# Patient Record
Sex: FEMALE | Race: BLACK OR AFRICAN AMERICAN | NOT HISPANIC OR LATINO
[De-identification: names, ages, dates, MRNs, and addresses within clinical notes are randomized per-mention and may not be internally consistent; named-entity substitution may affect disease eponyms.]

---

## 2018-08-30 ENCOUNTER — RESULT REVIEW (OUTPATIENT)
Age: 58
End: 2018-08-30

## 2019-02-05 ENCOUNTER — EMERGENCY (EMERGENCY)
Facility: HOSPITAL | Age: 59
LOS: 0 days | Discharge: HOME | End: 2019-02-05
Attending: EMERGENCY MEDICINE
Payer: COMMERCIAL

## 2019-02-05 VITALS
WEIGHT: 154.98 LBS | HEART RATE: 74 BPM | TEMPERATURE: 98 F | OXYGEN SATURATION: 100 % | RESPIRATION RATE: 18 BRPM | SYSTOLIC BLOOD PRESSURE: 108 MMHG | DIASTOLIC BLOOD PRESSURE: 56 MMHG | HEIGHT: 66 IN

## 2019-02-05 VITALS
DIASTOLIC BLOOD PRESSURE: 66 MMHG | HEART RATE: 88 BPM | RESPIRATION RATE: 18 BRPM | OXYGEN SATURATION: 100 % | TEMPERATURE: 98 F | SYSTOLIC BLOOD PRESSURE: 95 MMHG

## 2019-02-05 DIAGNOSIS — R10.9 UNSPECIFIED ABDOMINAL PAIN: ICD-10-CM

## 2019-02-05 DIAGNOSIS — N20.1 CALCULUS OF URETER: ICD-10-CM

## 2019-02-05 LAB
ALBUMIN SERPL ELPH-MCNC: 3.6 G/DL — SIGNIFICANT CHANGE UP (ref 3.3–5)
ALP SERPL-CCNC: 121 U/L — HIGH (ref 40–120)
ALT FLD-CCNC: 17 U/L — SIGNIFICANT CHANGE UP (ref 12–78)
ANION GAP SERPL CALC-SCNC: 7 MMOL/L — SIGNIFICANT CHANGE UP (ref 5–17)
APPEARANCE UR: CLEAR — SIGNIFICANT CHANGE UP
AST SERPL-CCNC: 16 U/L — SIGNIFICANT CHANGE UP (ref 15–37)
BACTERIA # UR AUTO: ABNORMAL
BASOPHILS # BLD AUTO: 0.04 K/UL — SIGNIFICANT CHANGE UP (ref 0–0.2)
BASOPHILS NFR BLD AUTO: 0.7 % — SIGNIFICANT CHANGE UP (ref 0–2)
BILIRUB SERPL-MCNC: 0.2 MG/DL — SIGNIFICANT CHANGE UP (ref 0.2–1.2)
BILIRUB UR-MCNC: NEGATIVE — SIGNIFICANT CHANGE UP
BUN SERPL-MCNC: 14 MG/DL — SIGNIFICANT CHANGE UP (ref 7–23)
CALCIUM SERPL-MCNC: 9.5 MG/DL — SIGNIFICANT CHANGE UP (ref 8.5–10.1)
CHLORIDE SERPL-SCNC: 109 MMOL/L — HIGH (ref 96–108)
CO2 SERPL-SCNC: 28 MMOL/L — SIGNIFICANT CHANGE UP (ref 22–31)
COLOR SPEC: YELLOW — SIGNIFICANT CHANGE UP
CREAT SERPL-MCNC: 0.96 MG/DL — SIGNIFICANT CHANGE UP (ref 0.5–1.3)
DIFF PNL FLD: ABNORMAL
EOSINOPHIL # BLD AUTO: 0.05 K/UL — SIGNIFICANT CHANGE UP (ref 0–0.5)
EOSINOPHIL NFR BLD AUTO: 0.9 % — SIGNIFICANT CHANGE UP (ref 0–6)
GLUCOSE SERPL-MCNC: 95 MG/DL — SIGNIFICANT CHANGE UP (ref 70–99)
GLUCOSE UR QL: NEGATIVE MG/DL — SIGNIFICANT CHANGE UP
HCT VFR BLD CALC: 46.5 % — HIGH (ref 34.5–45)
HGB BLD-MCNC: 14.1 G/DL — SIGNIFICANT CHANGE UP (ref 11.5–15.5)
IMM GRANULOCYTES NFR BLD AUTO: 0.2 % — SIGNIFICANT CHANGE UP (ref 0–1.5)
KETONES UR-MCNC: NEGATIVE — SIGNIFICANT CHANGE UP
LEUKOCYTE ESTERASE UR-ACNC: NEGATIVE — SIGNIFICANT CHANGE UP
LIDOCAIN IGE QN: 145 U/L — SIGNIFICANT CHANGE UP (ref 73–393)
LYMPHOCYTES # BLD AUTO: 2.16 K/UL — SIGNIFICANT CHANGE UP (ref 1–3.3)
LYMPHOCYTES # BLD AUTO: 38.6 % — SIGNIFICANT CHANGE UP (ref 13–44)
MAGNESIUM SERPL-MCNC: 2.2 MG/DL — SIGNIFICANT CHANGE UP (ref 1.6–2.6)
MCHC RBC-ENTMCNC: 26.3 PG — LOW (ref 27–34)
MCHC RBC-ENTMCNC: 30.3 GM/DL — LOW (ref 32–36)
MCV RBC AUTO: 86.8 FL — SIGNIFICANT CHANGE UP (ref 80–100)
MONOCYTES # BLD AUTO: 0.45 K/UL — SIGNIFICANT CHANGE UP (ref 0–0.9)
MONOCYTES NFR BLD AUTO: 8.1 % — SIGNIFICANT CHANGE UP (ref 2–14)
NEUTROPHILS # BLD AUTO: 2.88 K/UL — SIGNIFICANT CHANGE UP (ref 1.8–7.4)
NEUTROPHILS NFR BLD AUTO: 51.5 % — SIGNIFICANT CHANGE UP (ref 43–77)
NITRITE UR-MCNC: NEGATIVE — SIGNIFICANT CHANGE UP
NRBC # BLD: 0 /100 WBCS — SIGNIFICANT CHANGE UP (ref 0–0)
PH UR: 5 — SIGNIFICANT CHANGE UP (ref 5–8)
PLATELET # BLD AUTO: 200 K/UL — SIGNIFICANT CHANGE UP (ref 150–400)
POTASSIUM SERPL-MCNC: 4.6 MMOL/L — SIGNIFICANT CHANGE UP (ref 3.5–5.3)
POTASSIUM SERPL-SCNC: 4.6 MMOL/L — SIGNIFICANT CHANGE UP (ref 3.5–5.3)
PROT SERPL-MCNC: 7.9 GM/DL — SIGNIFICANT CHANGE UP (ref 6–8.3)
PROT UR-MCNC: NEGATIVE MG/DL — SIGNIFICANT CHANGE UP
RBC # BLD: 5.36 M/UL — HIGH (ref 3.8–5.2)
RBC # FLD: 13.3 % — SIGNIFICANT CHANGE UP (ref 10.3–14.5)
RBC CASTS # UR COMP ASSIST: ABNORMAL /HPF (ref 0–4)
SODIUM SERPL-SCNC: 144 MMOL/L — SIGNIFICANT CHANGE UP (ref 135–145)
SP GR SPEC: 1.02 — SIGNIFICANT CHANGE UP (ref 1.01–1.02)
UROBILINOGEN FLD QL: NEGATIVE MG/DL — SIGNIFICANT CHANGE UP
WBC # BLD: 5.59 K/UL — SIGNIFICANT CHANGE UP (ref 3.8–10.5)
WBC # FLD AUTO: 5.59 K/UL — SIGNIFICANT CHANGE UP (ref 3.8–10.5)
WBC UR QL: SIGNIFICANT CHANGE UP

## 2019-02-05 PROCEDURE — 99284 EMERGENCY DEPT VISIT MOD MDM: CPT

## 2019-02-05 PROCEDURE — 74176 CT ABD & PELVIS W/O CONTRAST: CPT | Mod: 26

## 2019-02-05 RX ORDER — ONDANSETRON 8 MG/1
4 TABLET, FILM COATED ORAL ONCE
Qty: 0 | Refills: 0 | Status: COMPLETED | OUTPATIENT
Start: 2019-02-05 | End: 2019-02-05

## 2019-02-05 RX ORDER — KETOROLAC TROMETHAMINE 30 MG/ML
30 SYRINGE (ML) INJECTION ONCE
Qty: 0 | Refills: 0 | Status: DISCONTINUED | OUTPATIENT
Start: 2019-02-05 | End: 2019-02-05

## 2019-02-05 RX ORDER — SODIUM CHLORIDE 9 MG/ML
1000 INJECTION INTRAMUSCULAR; INTRAVENOUS; SUBCUTANEOUS ONCE
Qty: 0 | Refills: 0 | Status: COMPLETED | OUTPATIENT
Start: 2019-02-05 | End: 2019-02-05

## 2019-02-05 RX ORDER — TAMSULOSIN HYDROCHLORIDE 0.4 MG/1
1 CAPSULE ORAL
Qty: 7 | Refills: 0 | OUTPATIENT
Start: 2019-02-05 | End: 2019-02-11

## 2019-02-05 RX ORDER — TAMSULOSIN HYDROCHLORIDE 0.4 MG/1
0.4 CAPSULE ORAL ONCE
Qty: 0 | Refills: 0 | Status: COMPLETED | OUTPATIENT
Start: 2019-02-05 | End: 2019-02-05

## 2019-02-05 RX ADMIN — TAMSULOSIN HYDROCHLORIDE 0.4 MILLIGRAM(S): 0.4 CAPSULE ORAL at 09:45

## 2019-02-05 RX ADMIN — ONDANSETRON 4 MILLIGRAM(S): 8 TABLET, FILM COATED ORAL at 09:45

## 2019-02-05 RX ADMIN — SODIUM CHLORIDE 1000 MILLILITER(S): 9 INJECTION INTRAMUSCULAR; INTRAVENOUS; SUBCUTANEOUS at 11:01

## 2019-02-05 RX ADMIN — Medication 30 MILLIGRAM(S): at 10:00

## 2019-02-05 RX ADMIN — Medication 30 MILLIGRAM(S): at 09:45

## 2019-02-05 RX ADMIN — SODIUM CHLORIDE 1000 MILLILITER(S): 9 INJECTION INTRAMUSCULAR; INTRAVENOUS; SUBCUTANEOUS at 09:46

## 2019-02-05 NOTE — ED PROVIDER NOTE - NSFOLLOWUPINSTRUCTIONS_ED_ALL_ED_FT
Take the pain medicine and the tamsulosin as prescribed to treat pain and a possible ureteral stone.    If not improved within 2 weeks, follow up with an ultrasound of your uterus.

## 2019-02-05 NOTE — ED PROVIDER NOTE - OBJECTIVE STATEMENT
Pertinent PMH/PSH/FHx/SHx and Review of Systems contained within:  58F hx of kidney stone on the right pw right flank pain since yesterday, fluctuating in intensity. not associated with nausea, vomiting, fever, dysuria, hematuria, abd pain, vaginal discharge. patient notes she has not taken anything for pain. had ct that dxed stone 1 week ago. No ha, vision loss, rhinorrhea, cp, sob, dizziness.  Fh and Sh not otherwise contributory  ROS otherwise negative

## 2019-02-05 NOTE — ED PROVIDER NOTE - MEDICAL DECISION MAKING DETAILS
patient pw right flank pain. likely renal stone. patient pw right flank pain. likely renal stone. ct does not show ureteral stone but there is blood in the urine so she may have passed it. no infection on ua. otherwise, the pain could be msk related. will rx nsaid and flomax and have follow up with doctor.

## 2019-02-05 NOTE — ED ADULT TRIAGE NOTE - CHIEF COMPLAINT QUOTE
pt presents to the ED with c/o right flank pain denies radiation told she has a stone by her PCP and to report to the ED for further evaluation

## 2019-02-05 NOTE — ED ADULT NURSE NOTE - CAS ELECT INFOMATION PROVIDED
DC instructions/discharged home, instructed to follow up with primary md, verbalized understanding of instructions

## 2019-02-05 NOTE — ED ADULT NURSE NOTE - NSIMPLEMENTINTERV_GEN_ALL_ED
Implemented All Universal Safety Interventions:  [place] to call system. Call bell, personal items and telephone within reach. Instruct patient to call for assistance. Room bathroom lighting operational. Non-slip footwear when patient is off stretcher. Physically safe environment: no spills, clutter or unnecessary equipment. Stretcher in lowest position, wheels locked, appropriate side rails in place.

## 2019-02-05 NOTE — ED PROVIDER NOTE - PHYSICAL EXAMINATION
Gen: Alert, NAD  Head: NC, AT   Eyes: PERRL, EOMI, normal lids/conjunctiva  ENT: normal hearing, patent oropharynx without erythema/exudate, uvula midline  Neck: supple, no tenderness, Trachea midline  Pulm: Bilateral BS, normal resp effort, no wheeze/stridor/retractions  CV: RRR, no M/R/G, 2+ radial and dp pulses bl, no edema  Abd: soft, NT/ND, +BS, no hepatosplenomegaly. minimal right cva t  Mskel: extremities x4 with normal ROM and no joint effusions. no ctl spine ttp.   Skin: no rash, no bruising   Neuro: AAOx3, no sensory/motor deficits, CN 2-12 intact

## 2019-07-24 ENCOUNTER — RESULT REVIEW (OUTPATIENT)
Age: 59
End: 2019-07-24

## 2019-08-12 ENCOUNTER — RESULT REVIEW (OUTPATIENT)
Age: 59
End: 2019-08-12

## 2020-12-08 ENCOUNTER — RESULT REVIEW (OUTPATIENT)
Age: 60
End: 2020-12-08

## 2021-10-30 ENCOUNTER — EMERGENCY (EMERGENCY)
Facility: HOSPITAL | Age: 61
LOS: 1 days | Discharge: HOME | End: 2021-10-30
Attending: EMERGENCY MEDICINE | Admitting: EMERGENCY MEDICINE
Payer: COMMERCIAL

## 2021-10-30 VITALS
HEIGHT: 66 IN | DIASTOLIC BLOOD PRESSURE: 60 MMHG | SYSTOLIC BLOOD PRESSURE: 110 MMHG | HEART RATE: 68 BPM | RESPIRATION RATE: 18 BRPM | OXYGEN SATURATION: 100 % | TEMPERATURE: 98 F

## 2021-10-30 VITALS
RESPIRATION RATE: 16 BRPM | TEMPERATURE: 98 F | SYSTOLIC BLOOD PRESSURE: 104 MMHG | HEART RATE: 63 BPM | OXYGEN SATURATION: 100 % | DIASTOLIC BLOOD PRESSURE: 90 MMHG

## 2021-10-30 LAB
ALBUMIN SERPL ELPH-MCNC: 4.4 G/DL — SIGNIFICANT CHANGE UP (ref 3.3–5)
ALP SERPL-CCNC: 68 U/L — SIGNIFICANT CHANGE UP (ref 40–120)
ALT FLD-CCNC: 21 U/L — SIGNIFICANT CHANGE UP (ref 4–33)
ANION GAP SERPL CALC-SCNC: 9 MMOL/L — SIGNIFICANT CHANGE UP (ref 7–14)
AST SERPL-CCNC: 20 U/L — SIGNIFICANT CHANGE UP (ref 4–32)
BILIRUB SERPL-MCNC: <0.2 MG/DL — SIGNIFICANT CHANGE UP (ref 0.2–1.2)
BUN SERPL-MCNC: 14 MG/DL — SIGNIFICANT CHANGE UP (ref 7–23)
CALCIUM SERPL-MCNC: 10 MG/DL — SIGNIFICANT CHANGE UP (ref 8.4–10.5)
CHLORIDE SERPL-SCNC: 106 MMOL/L — SIGNIFICANT CHANGE UP (ref 98–107)
CO2 SERPL-SCNC: 27 MMOL/L — SIGNIFICANT CHANGE UP (ref 22–31)
CREAT SERPL-MCNC: 0.82 MG/DL — SIGNIFICANT CHANGE UP (ref 0.5–1.3)
D DIMER BLD IA.RAPID-MCNC: <150 NG/ML DDU — SIGNIFICANT CHANGE UP
GLUCOSE SERPL-MCNC: 112 MG/DL — HIGH (ref 70–99)
HCT VFR BLD CALC: 44.2 % — SIGNIFICANT CHANGE UP (ref 34.5–45)
HGB BLD-MCNC: 13.7 G/DL — SIGNIFICANT CHANGE UP (ref 11.5–15.5)
LIDOCAIN IGE QN: 28 U/L — SIGNIFICANT CHANGE UP (ref 7–60)
MCHC RBC-ENTMCNC: 27.1 PG — SIGNIFICANT CHANGE UP (ref 27–34)
MCHC RBC-ENTMCNC: 31 GM/DL — LOW (ref 32–36)
MCV RBC AUTO: 87.5 FL — SIGNIFICANT CHANGE UP (ref 80–100)
NRBC # BLD: 0 /100 WBCS — SIGNIFICANT CHANGE UP
NRBC # FLD: 0 K/UL — SIGNIFICANT CHANGE UP
PLATELET # BLD AUTO: 169 K/UL — SIGNIFICANT CHANGE UP (ref 150–400)
POTASSIUM SERPL-MCNC: 4.2 MMOL/L — SIGNIFICANT CHANGE UP (ref 3.5–5.3)
POTASSIUM SERPL-SCNC: 4.2 MMOL/L — SIGNIFICANT CHANGE UP (ref 3.5–5.3)
PROT SERPL-MCNC: 7.8 G/DL — SIGNIFICANT CHANGE UP (ref 6–8.3)
RBC # BLD: 5.05 M/UL — SIGNIFICANT CHANGE UP (ref 3.8–5.2)
RBC # FLD: 13.5 % — SIGNIFICANT CHANGE UP (ref 10.3–14.5)
SODIUM SERPL-SCNC: 142 MMOL/L — SIGNIFICANT CHANGE UP (ref 135–145)
TROPONIN T, HIGH SENSITIVITY RESULT: <6 NG/L — SIGNIFICANT CHANGE UP
TROPONIN T, HIGH SENSITIVITY RESULT: <6 NG/L — SIGNIFICANT CHANGE UP
WBC # BLD: 5.44 K/UL — SIGNIFICANT CHANGE UP (ref 3.8–10.5)
WBC # FLD AUTO: 5.44 K/UL — SIGNIFICANT CHANGE UP (ref 3.8–10.5)

## 2021-10-30 PROCEDURE — 70496 CT ANGIOGRAPHY HEAD: CPT | Mod: 26,MA

## 2021-10-30 PROCEDURE — 0042T: CPT

## 2021-10-30 PROCEDURE — 93010 ELECTROCARDIOGRAM REPORT: CPT

## 2021-10-30 PROCEDURE — 99285 EMERGENCY DEPT VISIT HI MDM: CPT | Mod: 25

## 2021-10-30 PROCEDURE — 71046 X-RAY EXAM CHEST 2 VIEWS: CPT | Mod: 26

## 2021-10-30 PROCEDURE — 70498 CT ANGIOGRAPHY NECK: CPT | Mod: 26,MA

## 2021-10-30 PROCEDURE — 70450 CT HEAD/BRAIN W/O DYE: CPT | Mod: 26,MA,59

## 2021-10-30 RX ORDER — IBUPROFEN 200 MG
1 TABLET ORAL
Qty: 21 | Refills: 0
Start: 2021-10-30 | End: 2021-11-05

## 2021-10-30 RX ORDER — ASPIRIN/CALCIUM CARB/MAGNESIUM 324 MG
162 TABLET ORAL ONCE
Refills: 0 | Status: COMPLETED | OUTPATIENT
Start: 2021-10-30 | End: 2021-10-30

## 2021-10-30 RX ORDER — FAMOTIDINE 10 MG/ML
20 INJECTION INTRAVENOUS DAILY
Refills: 0 | Status: DISCONTINUED | OUTPATIENT
Start: 2021-10-30 | End: 2021-11-02

## 2021-10-30 RX ADMIN — FAMOTIDINE 20 MILLIGRAM(S): 10 INJECTION INTRAVENOUS at 11:08

## 2021-10-30 RX ADMIN — Medication 30 MILLILITER(S): at 11:08

## 2021-10-30 RX ADMIN — Medication 162 MILLIGRAM(S): at 11:08

## 2021-10-30 NOTE — ED PROVIDER NOTE - OBJECTIVE STATEMENT
Pt is a 60yoF w/no relevant PMHx p/w acute chest pain. Pt was unable to sleep last night due to discomfort, began experiencing sharp, burning 8/10 L-sided chest pain w/radiation to back, associated w/SOB and nausea. Pt is a 60yoF w/no relevant PMHx p/w acute chest pain. Pt was unable to sleep last night due to discomfort, began experiencing sharp, burning 8/10 L-sided chest pain w/radiation to back, associated w/SOB and nausea. Also describes dizziness for the last few days/feeling unsteady, ***incomplete note*** Pt is a 60yoF w/no relevant PMHx p/w acute chest pain. Pt was unable to sleep last night due to discomfort, began experiencing sharp, burning 8/10 L-sided chest pain w/radiation to back, associated w/SOB and nausea; pt took 2 baby asa before coming to ED which partially relieved the CP. Also describes dizziness for the last few days "feeling unsteady" associated w/bilateral ringing in ears and feeling "off-balance." Pt describes episode this am around 8am where she felt confused, couldn't brush her teeth 2/2 L arm weakness associated w/palpitations. Recent Moderna vaccine (#2) on Tuesday, otherwise pt felt her usual state of health until last night. She endorses SOB, burning/sharp CP, palpitations, nausea, dizziness/head spinning, L sided numbness; she denies HA, vomiting/diarrhea/constipation, fevers/chills, dysuria/hematuria, hematochezia/melena, swelling, generalized weakness, falls.

## 2021-10-30 NOTE — ED PROVIDER NOTE - PATIENT PORTAL LINK FT
You can access the FollowMyHealth Patient Portal offered by [facility] by registering at the following website: [email or web address]. By joining [employer]’s FollowMyHealth portal, you will also be able to view your health information using other applications (apps) compatible with our system.

## 2021-10-30 NOTE — ED PROVIDER NOTE - CLINICAL SUMMARY MEDICAL DECISION MAKING FREE TEXT BOX
Pt is a ***incomplete note*** Pt is a 60yoF w/no relevant PMHx p/w acute chest pain i/s/o several days of dizziness/ears ringing, 1 episode of sudden loss of strength in L arm this am now at baseline. VSS, phys exam significant for L-sided facial numbness w/o other neuro deficits, CP not reproducible on exam. R/o ACS, order cbc, cmp, trop, ecg, cxr, consider CT head for eval of ear ringing and L-sided facial numbness and reassess. -Melani Wilder, PGY-1

## 2021-10-30 NOTE — ED ADULT NURSE NOTE - IS THE PATIENT ABLE TO BE SCREENED?
[No] : No [0] : 2) Feeling down, depressed, or hopeless: Not at all (0) [With Family] : lives with family [] :  Yes [# Of Children ___] : has [unfilled] children [Sexually Active] : sexually active [Seat Belt] :  uses seat belt [] : No [de-identified] : no formal exercise [de-identified] : healthy [[id number]] : 0 [Reports changes in hearing] : Reports no changes in hearing [High Risk Behavior] : no high risk behavior [Reports changes in vision] : Reports no changes in vision [MammogramDate] : scheduled for today [PapSmearDate] : scheduled for Monday [Reports changes in dental health] : Reports no changes in dental health [de-identified] : [facility]

## 2021-10-30 NOTE — CONSULT NOTE ADULT - SUBJECTIVE AND OBJECTIVE BOX
Neurology  Consult Note  10-30-21    Name:  CEE ATKINS; 60y (1960)    Reason for Admission:     Chief Complaint:    HPI: 60 year old female w/ PMHx migraines p/w acute chest pain and shortness of breath overnight. Patient states that at around 08:30AM this morning, while brushing her teeth, she noted left arm "dullness," which she further describes as a heaviness with clumsiness. She also feels numbness in her left arm, face, and leg. Patient also reports ringing in her ear yesterday associated with dizziness ("feels like I'm spinning"). She also reports 8.5/10 headache yesterday, generalized, and throbbing. This is unlike her migraines in the past.     Review of Systems:  As states in HPI.        PMHx:     PFHx:     PSuHx:       Medications:  MEDICATIONS  (STANDING):  famotidine    Tablet 20 milliGRAM(s) Oral daily    MEDICATIONS  (PRN):      Vitals:  T(C): 36.9 (10-30-21 @ 10:04), Max: 36.9 (10-30-21 @ 10:04)  HR: 68 (10-30-21 @ 10:04) (68 - 68)  BP: 110/60 (10-30-21 @ 10:04) (110/60 - 110/60)  RR: 18 (10-30-21 @ 10:04) (18 - 18)  SpO2: 100% (10-30-21 @ 10:04) (100% - 100%)    Physical Examination: INCOMPLETE  Neurologic:    - Mental Status: Alert, awake, oriented to person, place, and time; Speech is fluent with intact naming, repetition, and comprehension; Good overall fund of knowledge; Immediate recall is 3/3 words and delayed recall is 3/3 words at 5 minutes; Able to spell WORLD backwards and perform serial 7 subtraction; Able to read and write a sentence.    - Cranial Nerves:  II: Visual fields are full to confrontation; Pupils are equal, round, and reactive to light;   III, IV, VI: Extraocular movements are intact without nystagmus.  V: Facial sensation is intact in the V1-V3 distribution bilaterally.  VII: Face is symmetric with normal eye closure and smile.  VIII: Hearing is intact to conversation.  IX, X: Uvula is midline and soft palate rises symmetrically.  XI: Head turning and shoulder shrug are intact.  XII: Tongue protrudes in the midline.    -Motor/Strength Testing:                                 Right           Left  Deltoid                     5                 5  Biceps                      5                 5  Triceps                     5                 5  Wrist Ext (radial)       5                 5  Wrist Flex                 5                 5  Interphalangeal        5                 5  APB (Thumb)            5                 5    Hip Flex                   5                  5  Knee Flex                 5                  5  Knee Ext	      5                  5  Dorsiflex                  5                  5  Plantarflex               5                  5    -There is no pronator drift. Normal muscle bulk and tone throughout.    - Reflexes:   Bicep (C5/C6):                  R 2+ --- L 2+   Tricep (C7):                      R 2+ --- L 2+   Brachioradialis (C5/C6) :   R 2+ --- L 2+   Patella (L3/L4) :                 R 2+ --- L 2+   Ankle (S1) :                       R 2+ --- L 2+     -Plant responses down bilaterally.    - Sensory: Intact throughout to light touch, pin prick, vibration, and joint-position.  - Coordination: Finger-nose-finger and heel-knee-shin intact without dysmetria. Rapid alternating hand movements intact.  - Gait: Normal steps, base, arm swing, and turning. Tandem gait is normal. Romberg testing is negative.    Labs:                        13.7   5.44  )-----------( 169      ( 30 Oct 2021 11:30 )             44.2          Radiology:     Neurology  Consult Note  10-30-21    Name:  CEE ATKINS; 60y (1960)    Reason for Admission:     Chief Complaint:    HPI: 60 year old female w/ PMHx migraines p/w acute chest pain and shortness of breath overnight. Patient states that at around 08:30AM this morning, while brushing her teeth, she noted left arm "dullness," which she further describes as a heaviness with clumsiness. She also feels numbness in her left arm, face, and leg. Patient also reports bilateral ringing in her ear yesterday associated with dizziness ("feels like I'm spinning"). She also reports 8.5/10 headache yesterday, generalized, and throbbing. This is unlike her migraines in the past.         Review of Systems:  As states in HPI.        PMHx:     PFHx:     PSuHx:       Medications:  MEDICATIONS  (STANDING):  famotidine    Tablet 20 milliGRAM(s) Oral daily    MEDICATIONS  (PRN):      Vitals:  T(C): 36.9 (10-30-21 @ 10:04), Max: 36.9 (10-30-21 @ 10:04)  HR: 68 (10-30-21 @ 10:04) (68 - 68)  BP: 110/60 (10-30-21 @ 10:04) (110/60 - 110/60)  RR: 18 (10-30-21 @ 10:04) (18 - 18)  SpO2: 100% (10-30-21 @ 10:04) (100% - 100%)    Physical Examination: INCOMPLETE  Neurologic:    - Mental Status: Alert, awake, oriented to person, place, and time; Speech is fluent with intact naming, repetition, and comprehension; Good overall fund of knowledge; Immediate recall is 3/3 words and delayed recall is 3/3 words at 5 minutes; Able to spell WORLD backwards and perform serial 7 subtraction; Able to read and write a sentence.    - Cranial Nerves:  II: Visual fields are full to confrontation; Pupils are equal, round, and reactive to light;   III, IV, VI: Extraocular movements are intact without nystagmus.  V: Facial sensation is intact in the V1-V3 distribution bilaterally.  VII: Face is symmetric with normal eye closure and smile.  VIII: Hearing is intact to conversation.  IX, X: Uvula is midline and soft palate rises symmetrically.  XI: Head turning and shoulder shrug are intact.  XII: Tongue protrudes in the midline.    -Motor/Strength Testing:                                 Right           Left  Deltoid                     5                 5  Biceps                      5                 5  Triceps                     5                 5  Wrist Ext (radial)       5                 5  Wrist Flex                 5                 5  Interphalangeal        5                 5  APB (Thumb)            5                 5    Hip Flex                   5                  5  Knee Flex                 5                  5  Knee Ext	      5                  5  Dorsiflex                  5                  5  Plantarflex               5                  5    -There is no pronator drift. Normal muscle bulk and tone throughout.    - Reflexes:   Bicep (C5/C6):                  R 2+ --- L 2+   Tricep (C7):                      R 2+ --- L 2+   Brachioradialis (C5/C6) :   R 2+ --- L 2+   Patella (L3/L4) :                 R 2+ --- L 2+   Ankle (S1) :                       R 2+ --- L 2+     -Plant responses down bilaterally.    - Sensory: Intact throughout to light touch, pin prick, vibration, and joint-position.  - Coordination: Finger-nose-finger and heel-knee-shin intact without dysmetria. Rapid alternating hand movements intact.  - Gait: Normal steps, base, arm swing, and turning. Tandem gait is normal. Romberg testing is negative.    Labs:                        13.7   5.44  )-----------( 169      ( 30 Oct 2021 11:30 )             44.2          Radiology:  < from: CT Angio Neck w/ IV Cont (10.30.21 @ 11:57) >  FINDINGS:    CTA BRAIN:  The [place] of [place] and vertebrobasilar system are unremarkable without evidence of stenosis, occlusion or saccular aneurysm dilation. No evidence for arterial venous malformation. The vertebral arteries are codominant.      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 There is normal relationship to the great vessels. The common carotid arteries, internal carotid arteries and vertebral arteries are normal in caliber. No evidence of stenosis, occlusion or saccular aneurysm dilation. The vertebral arteries are codominant.    CT PERFUSION:  Patient has only had less than 2 hours of symptoms may influence the CT perfusion.    No core infarct or evidence of delayed mean transit time is identified.    CBF<30% volume: 0 ml  Tmax>6.0 s volume: 0 ml  Mismatch volume: 0 ml  Mismatch ratio: none    IMPRESSION:    Negative CTP. No large vessel occlusion. Consider MRI as clinically warranted     Neurology  Consult Note  10-30-21    Name:  CEE ATKINS; 60y (1960)    Reason for Admission:     Chief Complaint:    HPI: 60 year old female w/ PMHx migraines p/w acute chest pain and shortness of breath overnight. Patient states that at around 08:30AM this morning, while brushing her teeth, she noted left arm "dullness," which she further describes as a heaviness with clumsiness. She also feels numbness in her left arm, face, and leg. Patient also reports bilateral ringing in her ear yesterday associated with dizziness ("feels like I'm spinning"). She also reports 8.5/10 headache yesterday, generalized, and throbbing. This is unlike her migraines in the past.       Review of Systems:    PMHx:   Migraines    PFHx:   No relevant    PSuHx:   No relevant    Medications:  MEDICATIONS  (STANDING):  famotidine    Tablet 20 milliGRAM(s) Oral daily    MEDICATIONS  (PRN):      Vitals:  T(C): 36.9 (10-30-21 @ 10:04), Max: 36.9 (10-30-21 @ 10:04)  HR: 68 (10-30-21 @ 10:04) (68 - 68)  BP: 110/60 (10-30-21 @ 10:04) (110/60 - 110/60)  RR: 18 (10-30-21 @ 10:04) (18 - 18)  SpO2: 100% (10-30-21 @ 10:04) (100% - 100%)    Physical Examination:   General: in no acute distress, non-diaphoretic  HEENT: non-icteric sclera, no conjunctival injection  Abdomen: soft, non-distended  Extremities: no edema, no wounds  Skin: no rashes, no lesions    Neurologic:    - Mental Status: Alert, awake, oriented to person, place, and time; Speech is fluent with intact naming, repetition, and comprehension; Good overall fund of knowledge;     - Cranial Nerves:  II: Visual fields are full to confrontation; Pupils are equal, round, and reactive to light;   III, IV, VI: Extraocular movements are intact without nystagmus.  V: Facial sensation is intact in the V1-V3 distribution bilaterally.  VII: Face is symmetric with normal eye closure and smile.  VIII: Hearing is intact to conversation.  IX, X: Uvula is midline and soft palate rises symmetrically.  XI: Head turning and shoulder shrug are intact.  XII: Tongue protrudes in the midline.    -Motor/Strength Testing:                                 Right           Left  Deltoid                     5                 5  Biceps                      5                 5  Triceps                     5                 5  Hip Flex                   5                  5  Knee Flex                 5                  5  Knee Ext	      5                  5  Dorsiflex                  5                  5  Plantarflex               5                  5    -There is no pronator drift. Normal muscle bulk and tone throughout.    - Reflexes:   Bicep (C5/C6):                  R 2+ --- L 2+   Brachioradialis (C5/C6) :   R 2+ --- L 2+   Patella (L3/L4) :                 R 2+ --- L 2+   Ankle (S1) :                       R 2+ --- L 2+     -Plant responses down bilaterally.    - Sensory: Intact throughout to light touch  - Coordination: Finger-nose-finger intact without dysmetria. Rapid alternating hand movements intact.  - Gait: Normal steps, base, arm swing, and turning.     Labs:                        13.7   5.44  )-----------( 169      ( 30 Oct 2021 11:30 )             44.2          Radiology:  < from: CT Angio Neck w/ IV Cont (10.30.21 @ 11:57) >  FINDINGS:    CTA BRAIN:  The [place] of [place] and vertebrobasilar system are unremarkable without evidence of stenosis, occlusion or saccular aneurysm dilation. No evidence for arterial venous malformation. The vertebral arteries are codominant.      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 There is normal relationship to the great vessels. The common carotid arteries, internal carotid arteries and vertebral arteries are normal in caliber. No evidence of stenosis, occlusion or saccular aneurysm dilation. The vertebral arteries are codominant.    CT PERFUSION:  Patient has only had less than 2 hours of symptoms may influence the CT perfusion.    No core infarct or evidence of delayed mean transit time is identified.    CBF<30% volume: 0 ml  Tmax>6.0 s volume: 0 ml  Mismatch volume: 0 ml  Mismatch ratio: none    IMPRESSION:    Negative CTP. No large vessel occlusion. Consider MRI as clinically warranted

## 2021-10-30 NOTE — ED PROVIDER NOTE - ATTENDING CONTRIBUTION TO CARE
60F p/w SOB and L upper left chest and back a/w nausea.  pain constant nonradiating a/w palpitations and some gen weakness, Pt is s/p moderna vax 2nd 4 days ago.  Also reports some dizziness, off balance.  Took ASA this AM and came here.  pMHX osteoporosis PSHX denies.  Fosamax.  Currently having some chest discomfort.  Exam benign here.  Pt also reports some ringing of ears. TMs OK here.  Some concern for ACS.  Plan check labs, trop, CXR.  EKG benign here.  Pt also reports some burning chest pain, rx pepcid, maalox.  Walking.  No nystagmus.  EKG SR At 66 no connor no std no twi.   qtc 383.  On exam pt reports decreased sensation to L arm face and leg, says it started at 830 this morning.  Face is symmetric here, gait is normal no motor weakness.  [employer] code called 1108 AM.  D/w neuro at this time. 60F p/w SOB and L upper left chest and back a/w nausea.  pain constant nonradiating a/w palpitations and some gen weakness, Pt is s/p moderna vax 2nd 4 days ago.  Also reports some dizziness, off balance.  Took ASA this AM and came here.  pMHX osteoporosis PSHX denies.  Fosamax.  Currently having some chest discomfort.  Exam benign here.  Pt also reports some ringing of ears. TMs OK here.  Some concern for ACS.  Plan check labs, trop, CXR.  EKG benign here.  Pt also reports some burning chest pain, rx pepcid, maalox.  Walking.  No nystagmus.  EKG SR At 66 no connor no std no twi.   qtc 383.  On exam pt reports decreased sensation to L arm face and leg, says it started at 830 this morning.  Face is symmetric here, gait is normal no motor weakness.  [employer] code called 1108 AM.  D/w neuro at this time.  VS:  unremarkable    GEN - NAD;  malaise;   A+O x3   HEAD - NC/AT     ENT - PEERL, EOMI, mucous membranes    moist , no discharge      NECK: Neck supple, non-tender without lymphadenopathy, no masses, no JVD  PULM - CTA b/l,  symmetric breath sounds  COR -  normal heart sounds    ABD - , ND, NT, soft,  BACK - no CVA tenderness, nontender spine     EXTREMS - no edema, no deformity, warm and well perfused    SKIN - no rash    or bruising      NEUROLOGIC - alert, face symmetric, speech fluent, sensation decreased to arm, face, leg;  motor no focal deficit.

## 2021-10-30 NOTE — ED PROVIDER NOTE - CHIEF COMPLAINT
The patient is a 60y Female complaining of  The patient is a 60y Female complaining of multiple medical complaints.

## 2021-10-30 NOTE — ED ADULT NURSE NOTE - OBJECTIVE STATEMENT
Receive pt. in Intake room 7 alert and oriented x 4, presenting to the ER with complaints of left upper chest burning  and upper back pain. Pt. stated "I had  my 2nd moderna vaccine on Tuesday and yesterday I started having ringing in my ears, burning in my left chest area, and pain in my upper left back". No respiratory distress, no c/o shortness of breath, ambulating without difficulty, will continue to monitor.

## 2021-10-30 NOTE — ED PROVIDER NOTE - PROGRESS NOTE DETAILS
Per neuro, no further inpatient workup needed, pt can f/u w/her private neurologist outpt or she can follow-up with Dr. Schoenberg. Will DCTH w/neuro and cardiac f/u if repeat trop negative. - Melani Wilder, PGY-1

## 2021-10-30 NOTE — ED PROVIDER NOTE - NSFOLLOWUPINSTRUCTIONS_ED_ALL_ED_FT
PLEASE TAKE THE FOLLOWING MEDICATIONS AS PRESCRIBED: ibuprofen 600mg, 1 tab as needed for headache/migraine pain. You may take 600 mg ibuprofen every 8 hours, with food, as needed for pain.    PLEASE RETURN TO ED FOR NEW OR WORSENING SYMPTOMS (intractable nausea/vomiting, severe bleeding, fever over 100.4F, loss of movement of one or more limbs, seizure)      You were seen and evaluated in the Emergency Department for your dizziness and chest pain. You were evaluated clinically and with laboratory studies.    While emergent evaluation does not demonstrate any immediate life-threats, we strongly recommend you follow up with one of our Neurologists, Dr. Schoenberg, for further evaluation of your headache symptoms. If you do not have a Neurologist and/or are unable to schedule an appointment with Dr. Schoenberg, you can call the following number to schedule an appointment with our Neurology partners:    [facility]  Neurology  [address] [place] [address] - [address] [place] [address]  Phone: [phone]    At this time your clinical evaluation and history related to your chest pain do not demonstrate any acute, life-threatening medical conditions warranting emergent treatment. However, we strongly recommend you follow up with one of our Cardiology consultants (or your own) for further evaluation of your symptoms by calling the following number to make an appointment:    [facility]  Cardiology  [address] [place] [id number]  Phone: [phone]    Should you develop nausea, vomiting, worsening headaches, inability to walk properly, numbness or tingling in the extremities, dizziness, or changes to your hearing/vision, new or worsening chest pain, shortness of breath, fevers, chills, nausea, vomiting, diarrhea, or constipation - please return to the ED for immediate evaluation.      We also strongly encourage you make an appointment with your Primary Care Physician for a comprehensive evaluation of your health.

## 2021-10-30 NOTE — ED ADULT TRIAGE NOTE - CHIEF COMPLAINT QUOTE
Pt c/o lt sided chest pain accompanied by nausea and palpitations since this am--Pt c/o that she received her 2nd moderna vaccine and maybe this is a reaction

## 2021-10-30 NOTE — ED PROVIDER NOTE - CARE PROVIDER_API CALL
Schoenberg, Laura G (DO)  Neurology  [address] [place] [address]  Phone: [phone]  Fax: [phone]  Follow Up Time: 7-10 Days

## 2021-10-30 NOTE — ED PROVIDER NOTE - NS ED ROS FT
GENERAL: No fever or chills, EYES: no change in vision, HEENT: no trouble swallowing or speaking, CARDIAC: +chest pain, PULMONARY: no cough or SOB, GI: no abdominal pain, no nausea, no vomiting, no diarrhea or constipation, : No changes in urination, SKIN: no rashes, NEURO: no headache,  MSK: No joint pain     All other ROS negative unless otherwise specified in HPI.     ~Melani Wilder M.D. Resident

## 2021-10-30 NOTE — ED PROVIDER NOTE - PHYSICAL EXAMINATION
Gen: AAOx3, non-toxic  Head: NCAT  HEENT: EOMI, oral mucosa moist, normal conjunctiva  Lung: CTAB, no respiratory distress, no wheezes/rhonchi/rales B/L, speaking in full sentences  CV: RRR, no murmurs, rubs or gallops  Abd: soft, NTND, no guarding, no CVA tenderness  MSK: no visible deformities  Neuro: +decreased sensation of L face compared to R; otherwise CNs II-XII intact, no focal sensory or motor deficits, FNF, heel-shin and gait WNL  Skin: Warm, well perfused, no rash  Psych: normal affect.   ~Melani Wilder M.D. Resident Gen: AAOx3, non-toxic  Head: NCAT  HEENT: EOMI, PERRLA, oral mucosa moist, normal conjunctiva, TMs clear b/l, no JVD or lymphadenopathy  Lung: CTAB, no respiratory distress, no wheezes/rhonchi/rales B/L, speaking in full sentences  CV: RRR, no murmurs, rubs or gallops  Abd: soft, NTND, no guarding, no CVA tenderness  MSK: no visible deformities  Neuro: +decreased sensation of L face compared to R; otherwise CNs II-XII intact, no focal sensory or motor deficits, FNF, heel-shin and gait WNL  Skin: Warm, well perfused, no rash  Psych: normal affect.   ~Melani Wilder M.D. Resident

## 2021-10-30 NOTE — CONSULT NOTE ADULT - ASSESSMENT
Assessment: 60 year old left-handed female w/ PMHx migraines who p/w acute chest pain and SOB; code stroke called for left arm heaviness/clumsiness and numbness in left face, LUE, and LLE. Patient also reported tinnitus bilaterally, dizziness ("feels like I'm spinning"), 8/5/10 HA yesterday (generalized, throbbing), unlike her migraines in the past. Initial VS in ED unremarkable. Of note, paresthesias, headache, and heaviness sensation all resolved during ED course.    mRS: 0  NIHSS: 0  tPA not given -- mild deficits, improving deficits  Thrombectomy not performed -- no LVO    Impression: severe headache a/w left-sided paresthesias and left hemiplegia; differential most consistent with complex migraine; unlikely ischemic stroke; unlikely TIA    Plan:  -can discharge on ibuprofen 600mg PRN for migraines  -patient can follow-up with her own outpatient Neurologist, or follow-up with Neurologist Dr. Schoenberg   -no further neurological workup indicated    * Plan discussed with Neurology Attending Dr. Schoenberg *

## 2021-10-30 NOTE — ED PROVIDER NOTE - IV ALTEPLASE INCLUSION HIDDEN
Physical Therapy Rehab Certification       ASSESSMENT/TREATMENT RESPONSE:  Patient presents to skilled physical therapy with left sided cervical pain and left shoulder pain. Cervical pain presents with signs and symptoms of cervical hypomobility with facet dysfunction. Left shoulder pain presents with signs and symptoms of subacromial impingement. Her neck pain symptoms increase with left greater than right cervical rotation, driving, looking up, and left sidebending. Her left shoulder pain increased with reaching into abduction greater than flexion overhead, reaching across her body toward opposite shoulder, and when lifting. She demonstrates postural dysfunction with forward head, decreased cervical lordosis, and protracted shoulders bilaterally. She demonstrates increased upper trap over activation when reaching overhead and demonstrates reduced left scapular upglide when reaching into abduction overhead. Treatment will address limited cervical active range of motion and passive range of motion, reduced left scapulohumeral rhythm during active left shoulder motion, weakness of the left shoulder and scapula, decreased deep neck flexor activation and control, impaired joint mobility in the cervical spine, impaired joint mobility at the left glenohumeral joint, decreased muscle length at left upper trap/left levator scap/and pectorals, increased muscle tone at cervical and left shoulder soft tissue, and impaired functional mobility of the cervical spine and left shoulder as noted on the functional assessment questionnaire. Based on the findings of this evaluation, the patient is appropriate for skilled outpatient physical therapy services and would benefit from these interventions to achieve the above stated goals.    Goals:   Short Term Goals (to be achieved in 3 weeks)  1. Patient will improve left cervical rotation to 70 degrees or greater to facilitate turning head to check blind spots when driving.    2. Patient will be able to reach left shoulder into abduction at 160 degrees without shoulder pain exceeding 2-3/10 for reaching into heigh shelves.   3. Patient will demonstrate decreased muscular tone at cervical spine by 25-50% to facilitate improved cervical active range of motion for activities of daily living.  4. Patient will decrease sleep disturbance by 50% secondary to cervical pain to improve quality of life.    Long Term Goals (to be achieved in 6 weeks)  1. Patient will be independent with the home exercise program.  2. Patient will demonstrate improved ability to stabilize left scapula with active motions for improved quality of motion and decreased impingement to perform functional tasks/ADLs .  3. Patient will demonstrate increased strength to 5/5 in all planes at left shoulder in order to complete reaching and lifting tasks for work.  4. Patient will demonstrate Good deep neck flexor activation and control during upper extremity movement with exercises to facilitate improved cervical stability for household and work tasks.    A clinical presentation with:   stable and/or uncomplicated charactaristics    Patient's response to treatment:   Better understanding of disease/injury    Functional improvement noted:   Patient demonstrated understanding of home exercise program.    Prognosis for meeting goals:   good.   Treatment will consist of PT POC: electrical stimulation, hot packs, cold packs, activities of daily living, biomechanical training, home program, manual therapy, neuromuscular re-education, therapeutic activities and therapeutic exercise.  Treatment plan was discussed with the patient with use of an  and verbal consent was obtained.    Remaining Impairment Requiring Continued Treatment:   decreased range of motion, decreased flexibility, decreased strength, pain, inflammation and impairment of functional performance    See goals in the OBJECTIVE section of this note.    PLAN:    Patient will be seen 2-3 times per week for 6 weeks. Initiate plan of care to address functional limitations next visit with progression of strengthening and range of motion as tolerated.       I agreed with the plan of care as outlined by the therapist.   show

## 2022-01-24 PROBLEM — M81.0 AGE-RELATED OSTEOPOROSIS WITHOUT CURRENT PATHOLOGICAL FRACTURE: Chronic | Status: ACTIVE | Noted: 2021-11-01

## 2022-03-16 ENCOUNTER — APPOINTMENT (OUTPATIENT)
Dept: OBGYN | Facility: CLINIC | Age: 62
End: 2022-03-16
Payer: COMMERCIAL

## 2022-03-16 VITALS
BODY MASS INDEX: 25.07 KG/M2 | WEIGHT: 156 LBS | DIASTOLIC BLOOD PRESSURE: 71 MMHG | HEIGHT: 66 IN | SYSTOLIC BLOOD PRESSURE: 105 MMHG

## 2022-03-16 DIAGNOSIS — Z01.419 ENCOUNTER FOR GYNECOLOGICAL EXAMINATION (GENERAL) (ROUTINE) W/OUT ABNORMAL FINDINGS: ICD-10-CM

## 2022-03-16 DIAGNOSIS — Z00.00 ENCOUNTER FOR GENERAL ADULT MEDICAL EXAMINATION W/OUT ABNORMAL FINDINGS: ICD-10-CM

## 2022-03-16 DIAGNOSIS — M81.0 AGE-RELATED OSTEOPOROSIS W/OUT CURRENT PATHOLOGICAL FRACTURE: ICD-10-CM

## 2022-03-16 PROCEDURE — 82270 OCCULT BLOOD FECES: CPT

## 2022-03-16 PROCEDURE — 99396 PREV VISIT EST AGE 40-64: CPT

## 2022-03-16 RX ORDER — ALENDRONATE SODIUM 70 MG/1
70 TABLET ORAL
Refills: 0 | Status: ACTIVE | COMMUNITY
Start: 2022-03-16

## 2022-03-18 LAB — HPV HIGH+LOW RISK DNA PNL CVX: NOT DETECTED

## 2022-03-22 LAB — CYTOLOGY CVX/VAG DOC THIN PREP: NORMAL

## 2022-04-11 ENCOUNTER — NON-APPOINTMENT (OUTPATIENT)
Age: 62
End: 2022-04-11

## 2022-04-18 ENCOUNTER — ASOB RESULT (OUTPATIENT)
Age: 62
End: 2022-04-18

## 2022-04-18 ENCOUNTER — APPOINTMENT (OUTPATIENT)
Dept: OBGYN | Facility: CLINIC | Age: 62
End: 2022-04-18
Payer: COMMERCIAL

## 2022-04-18 PROCEDURE — 76830 TRANSVAGINAL US NON-OB: CPT

## 2022-04-26 ENCOUNTER — NON-APPOINTMENT (OUTPATIENT)
Age: 62
End: 2022-04-26

## 2023-03-02 ENCOUNTER — EMERGENCY (EMERGENCY)
Facility: HOSPITAL | Age: 63
LOS: 1 days | Discharge: LEFT BEFORE DISCHARGE | End: 2023-03-02
Attending: STUDENT IN AN ORGANIZED HEALTH CARE EDUCATION/TRAINING PROGRAM | Admitting: STUDENT IN AN ORGANIZED HEALTH CARE EDUCATION/TRAINING PROGRAM
Payer: COMMERCIAL

## 2023-03-02 VITALS
TEMPERATURE: 98 F | OXYGEN SATURATION: 99 % | SYSTOLIC BLOOD PRESSURE: 99 MMHG | HEART RATE: 78 BPM | RESPIRATION RATE: 18 BRPM | DIASTOLIC BLOOD PRESSURE: 59 MMHG

## 2023-03-02 VITALS
SYSTOLIC BLOOD PRESSURE: 98 MMHG | RESPIRATION RATE: 16 BRPM | HEART RATE: 79 BPM | DIASTOLIC BLOOD PRESSURE: 71 MMHG | TEMPERATURE: 98 F | OXYGEN SATURATION: 100 %

## 2023-03-02 LAB
ALBUMIN SERPL ELPH-MCNC: 4.4 G/DL — SIGNIFICANT CHANGE UP (ref 3.3–5)
ALP SERPL-CCNC: 65 U/L — SIGNIFICANT CHANGE UP (ref 40–120)
ALT FLD-CCNC: 14 U/L — SIGNIFICANT CHANGE UP (ref 4–33)
ANION GAP SERPL CALC-SCNC: 11 MMOL/L — SIGNIFICANT CHANGE UP (ref 7–14)
APTT BLD: 31.3 SEC — SIGNIFICANT CHANGE UP (ref 27–36.3)
AST SERPL-CCNC: 21 U/L — SIGNIFICANT CHANGE UP (ref 4–32)
BASOPHILS # BLD AUTO: 0.05 K/UL — SIGNIFICANT CHANGE UP (ref 0–0.2)
BASOPHILS NFR BLD AUTO: 0.9 % — SIGNIFICANT CHANGE UP (ref 0–2)
BILIRUB SERPL-MCNC: 0.2 MG/DL — SIGNIFICANT CHANGE UP (ref 0.2–1.2)
BUN SERPL-MCNC: 22 MG/DL — SIGNIFICANT CHANGE UP (ref 7–23)
CALCIUM SERPL-MCNC: 10.6 MG/DL — HIGH (ref 8.4–10.5)
CHLORIDE SERPL-SCNC: 104 MMOL/L — SIGNIFICANT CHANGE UP (ref 98–107)
CO2 SERPL-SCNC: 28 MMOL/L — SIGNIFICANT CHANGE UP (ref 22–31)
CREAT SERPL-MCNC: 0.88 MG/DL — SIGNIFICANT CHANGE UP (ref 0.5–1.3)
EGFR: 75 ML/MIN/1.73M2 — SIGNIFICANT CHANGE UP
EOSINOPHIL # BLD AUTO: 0.07 K/UL — SIGNIFICANT CHANGE UP (ref 0–0.5)
EOSINOPHIL NFR BLD AUTO: 1.2 % — SIGNIFICANT CHANGE UP (ref 0–6)
FLUAV AG NPH QL: SIGNIFICANT CHANGE UP
FLUBV AG NPH QL: SIGNIFICANT CHANGE UP
GLUCOSE SERPL-MCNC: 99 MG/DL — SIGNIFICANT CHANGE UP (ref 70–99)
HCT VFR BLD CALC: 46.1 % — HIGH (ref 34.5–45)
HGB BLD-MCNC: 13.9 G/DL — SIGNIFICANT CHANGE UP (ref 11.5–15.5)
IANC: 2.85 K/UL — SIGNIFICANT CHANGE UP (ref 1.8–7.4)
IMM GRANULOCYTES NFR BLD AUTO: 0.2 % — SIGNIFICANT CHANGE UP (ref 0–0.9)
INR BLD: 1.06 RATIO — SIGNIFICANT CHANGE UP (ref 0.88–1.16)
LYMPHOCYTES # BLD AUTO: 2.35 K/UL — SIGNIFICANT CHANGE UP (ref 1–3.3)
LYMPHOCYTES # BLD AUTO: 41.2 % — SIGNIFICANT CHANGE UP (ref 13–44)
MCHC RBC-ENTMCNC: 26.1 PG — LOW (ref 27–34)
MCHC RBC-ENTMCNC: 30.2 GM/DL — LOW (ref 32–36)
MCV RBC AUTO: 86.7 FL — SIGNIFICANT CHANGE UP (ref 80–100)
MONOCYTES # BLD AUTO: 0.38 K/UL — SIGNIFICANT CHANGE UP (ref 0–0.9)
MONOCYTES NFR BLD AUTO: 6.7 % — SIGNIFICANT CHANGE UP (ref 2–14)
NEUTROPHILS # BLD AUTO: 2.85 K/UL — SIGNIFICANT CHANGE UP (ref 1.8–7.4)
NEUTROPHILS NFR BLD AUTO: 49.8 % — SIGNIFICANT CHANGE UP (ref 43–77)
NRBC # BLD: 0 /100 WBCS — SIGNIFICANT CHANGE UP (ref 0–0)
NRBC # FLD: 0 K/UL — SIGNIFICANT CHANGE UP (ref 0–0)
PLATELET # BLD AUTO: 208 K/UL — SIGNIFICANT CHANGE UP (ref 150–400)
POTASSIUM SERPL-MCNC: 4.3 MMOL/L — SIGNIFICANT CHANGE UP (ref 3.5–5.3)
POTASSIUM SERPL-SCNC: 4.3 MMOL/L — SIGNIFICANT CHANGE UP (ref 3.5–5.3)
PROT SERPL-MCNC: 8.1 G/DL — SIGNIFICANT CHANGE UP (ref 6–8.3)
PROTHROM AB SERPL-ACNC: 12.3 SEC — SIGNIFICANT CHANGE UP (ref 10.5–13.4)
RBC # BLD: 5.32 M/UL — HIGH (ref 3.8–5.2)
RBC # FLD: 13.5 % — SIGNIFICANT CHANGE UP (ref 10.3–14.5)
RSV RNA NPH QL NAA+NON-PROBE: SIGNIFICANT CHANGE UP
SARS-COV-2 RNA SPEC QL NAA+PROBE: SIGNIFICANT CHANGE UP
SODIUM SERPL-SCNC: 143 MMOL/L — SIGNIFICANT CHANGE UP (ref 135–145)
TSH SERPL-MCNC: 0.58 UIU/ML — SIGNIFICANT CHANGE UP (ref 0.27–4.2)
WBC # BLD: 5.71 K/UL — SIGNIFICANT CHANGE UP (ref 3.8–10.5)
WBC # FLD AUTO: 5.71 K/UL — SIGNIFICANT CHANGE UP (ref 3.8–10.5)

## 2023-03-02 PROCEDURE — 70496 CT ANGIOGRAPHY HEAD: CPT | Mod: 26,MG

## 2023-03-02 PROCEDURE — 99285 EMERGENCY DEPT VISIT HI MDM: CPT

## 2023-03-02 PROCEDURE — 93010 ELECTROCARDIOGRAM REPORT: CPT

## 2023-03-02 PROCEDURE — 70498 CT ANGIOGRAPHY NECK: CPT | Mod: 26,MG

## 2023-03-02 PROCEDURE — G1004: CPT

## 2023-03-02 RX ORDER — MECLIZINE HCL 12.5 MG
25 TABLET ORAL ONCE
Refills: 0 | Status: COMPLETED | OUTPATIENT
Start: 2023-03-02 | End: 2023-03-02

## 2023-03-02 RX ORDER — CYCLOBENZAPRINE HYDROCHLORIDE 10 MG/1
5 TABLET, FILM COATED ORAL ONCE
Refills: 0 | Status: COMPLETED | OUTPATIENT
Start: 2023-03-02 | End: 2023-03-02

## 2023-03-02 RX ORDER — LIDOCAINE 4 G/100G
1 CREAM TOPICAL ONCE
Refills: 0 | Status: COMPLETED | OUTPATIENT
Start: 2023-03-02 | End: 2023-03-02

## 2023-03-02 RX ORDER — ACETAMINOPHEN 500 MG
650 TABLET ORAL ONCE
Refills: 0 | Status: COMPLETED | OUTPATIENT
Start: 2023-03-02 | End: 2023-03-02

## 2023-03-02 RX ADMIN — LIDOCAINE 1 PATCH: 4 CREAM TOPICAL at 15:47

## 2023-03-02 RX ADMIN — Medication 650 MILLIGRAM(S): at 16:47

## 2023-03-02 RX ADMIN — Medication 25 MILLIGRAM(S): at 15:47

## 2023-03-02 RX ADMIN — Medication 650 MILLIGRAM(S): at 15:47

## 2023-03-02 RX ADMIN — CYCLOBENZAPRINE HYDROCHLORIDE 5 MILLIGRAM(S): 10 TABLET, FILM COATED ORAL at 15:46

## 2023-03-02 NOTE — CONSULT NOTE ADULT - ASSESSMENT
62F LH w/ tinnitus, osteoporosis on alendronate, chronic neck pain with multilevel cervical disc herniations presents with 3 weeks of dizziness, headache, nausea. Patient reports mild intermittent feeling of imbalance for 3 times in the last 3 weeks. The dizziness is not room-spinning, sudden onset for <1 minute, no triggers identified (happened when she was sitting), not worse with position change or head movements, with mild nausea. Pt reports chronic tinnitus worse in the last 2 weeks, no hearing loss (went to an audiologist last week). Pt also reports a headache, pulling sensation, b/l post-auricular area, not positional, gradual onset, only clear trigger is stress, unclear if related to dizziness.  Patient reports 2 weeks ago, she had an epidural injection for her cervical neck pain.     Impression: episodic dizziness, headache, chronic tinnitus possibly cervicogenic vertigo vs. Meniere's disease. Low suspicion for stroke/TIA    Recommendations:  [x] CT/CTA negative  [] BP measurements in both arms + orthostatic VS  [] EKG to evaluate for arrhythmias  [] consider IVFs  [] MRI brain w/o contrast and MRI cervical spine   [] refer for Vestibular Rehab on discharge  [] ENT f/u outpatient  [] Neurology f/u outpatient     Case to be discussed with Dr. Merlos 62F LH w/ tinnitus, osteoporosis on alendronate, chronic neck pain with multilevel cervical disc herniations presents with 3 weeks of dizziness, headache, nausea. Patient reports mild intermittent feeling of imbalance for 3 times in the last 3 weeks. The dizziness is not room-spinning, sudden onset for <1 minute, no triggers identified (happened when she was sitting), not worse with position change or head movements, with mild nausea. Pt reports chronic tinnitus worse in the last 2 weeks, no hearing loss (went to an audiologist last week). Pt also reports a headache, pulling sensation, b/l post-auricular area, not positional, gradual onset, only clear trigger is stress, unclear if related to dizziness.  Patient reports 2 weeks ago, she had an epidural injection for her cervical neck pain.     Impression: episodic dizziness, headache, chronic tinnitus possibly cervicogenic vertigo vs. Meniere's disease. Low suspicion for stroke/TIA or CSF leak    Recommendations:  [x] CT/CTA negative  [] BP measurements in both arms + orthostatic VS  [] EKG to evaluate for arrhythmias  [] consider IVFs  [] MRI brain w/o contrast and MRI cervical spine, can be done inpatient vs. outpatient  [] refer for Vestibular Rehab on discharge  [] ENT f/u outpatient  [] Patient can follow up with Dr. Michael Nissenbaum after discharge. Please instruct the patient to call [phone] to schedule an appointment within the next 1-2 weeks. Office is located at [address] [place] [address], [place] [address].    Case discussed with Dr. Merlos 62F LH w/ tinnitus, osteoporosis on alendronate, chronic neck pain with multilevel cervical disc herniations presents with 3 weeks of dizziness, headache, nausea. Patient reports mild intermittent feeling of imbalance for 3 times in the last 3 weeks. The dizziness is not room-spinning, sudden onset for <1 minute, no triggers identified (happened when she was sitting), not worse with position change or head movements, with mild nausea. Pt reports chronic tinnitus worse in the last 2 weeks, no hearing loss (went to an audiologist last week). Pt also reports a headache, pulling sensation, b/l post-auricular area, not positional, gradual onset, only clear trigger is stress, unclear if related to dizziness.  Patient reports 2 weeks ago, she had an epidural injection for her cervical neck pain.     Impression: episodic dizziness, headache, chronic tinnitus possibly cervicogenic vertigo vs. Meniere's disease. Low suspicion for stroke/TIA or CSF leak    Recommendations:  [x] CT/CTA negative  [] BP measurements in both arms + orthostatic VS  [] EKG to evaluate for arrhythmias  [] consider IVFs  [] MRI brain w/o contrast and MRI cervical spine, can be done inpatient vs. outpatient  [] refer for Vestibular Rehab on discharge  [] ENT f/u outpatient  [] tylenol, lidocaine patch PRN for neck pain  [] Patient can follow up with Dr. Michael Nissenbaum after discharge. Please instruct the patient to call [phone] to schedule an appointment within the next 1-2 weeks. Office is located at [address] [place] [address], [place] [address].    Case discussed with Dr. Merlos

## 2023-03-02 NOTE — CONSULT NOTE ADULT - SUBJECTIVE AND OBJECTIVE BOX
HPI:  62F LH w/ PMHx migraines, osteoporosis on alendronate, chronic neck pain presents with 3 weeks of gait imbalance, dizziness, N/V. Patient reports mild feeling of imbalance for 3 weeks, which intermittently worsens.  Patient reports 2 weeks ago, she had an epidural injection for her cervical neck pain.  Patient reports that day, she developed more intense dizziness.  Patient also reports mild gradual onset pressure-like occipital headache for the past 2 weeks.  Patient reports some nausea when dizziness becomes more intense.  Patient reports intermittent numbness and tingling of bilateral hands for the past several years.  Patient also reports intermittent palpitations for the past several years.  Patient denies any fevers, chills, vomiting, changes in vision, hearing loss, ear pain, head trauma, use of blood thinners, history of malignancy, alcohol abuse, illicit drug use, chest pain, shortness of breath, new numbness, new weakness, new paresthesias, inability to stand or walk    NIHSS:   preMRS:     REVIEW OF SYSTEMS    A 10-system ROS was performed and is negative except for those items noted above and/or in the HPI.    PAST MEDICAL & SURGICAL HISTORY:  Osteoporosis        FAMILY HISTORY:    SOCIAL HISTORY:   T/E/D:   Occupation:   Lives with:     MEDICATIONS (HOME):  Home Medications:    MEDICATIONS  (STANDING):    MEDICATIONS  (PRN):    ALLERGIES/INTOLERANCES:  Allergies  No Known Allergies    Intolerances    VITALS & EXAMINATION:  Vital Signs Last 24 Hrs  T(C): 36.7 (02 Mar 2023 12:54), Max: 36.7 (02 Mar 2023 12:54)  T(F): 98 (02 Mar 2023 12:54), Max: 98 (02 Mar 2023 12:54)  HR: 78 (02 Mar 2023 12:54) (78 - 78)  BP: 99/59 (02 Mar 2023 12:54) (99/59 - 99/59)  BP(mean): --  RR: 18 (02 Mar 2023 12:54) (18 - 18)  SpO2: 99% (02 Mar 2023 12:54) (99% - 99%)    Parameters below as of 02 Mar 2023 12:54  Patient On (Oxygen Delivery Method): room air      General:  Constitutional: Obese Female, appears stated age, in no apparent distress including pain  Head: Normocephalic & atraumatic.  Respiratory: No increased work of breathing at rest  Extremities: No cyanosis, clubbing, or edema.  Skin: No rashes, bruising, or discoloration.    Neurological (>12):  MS: Awake, alert, oriented to person, place, situation, time. Normal affect. Follows all commands.    Language: Speech is clear, fluent with good repetition & comprehension (able to name objects___)    CNs: PERRLA (R = 3mm, L = 3mm). VFF. EOMI no nystagmus, no diplopia. V1-3 intact to LT/pinprick, well developed masseter muscles b/l. No facial asymmetry b/l, full eye closure strength b/l. Hearing grossly normal (rubbing fingers) b/l. Symmetric palate elevation in midline. Gag reflex deferred. Head turning & shoulder shrug intact b/l. Tongue midline, normal movements, no atrophy.      Motor: Normal muscle bulk & tone. No noticeable tremor or seizure. No pronator drift.              Deltoid	Biceps	Triceps	Wrist	Finger ABd	   R	5	5	5	5	5		5 	  L	5	5	5	5	5		5    	H-Flex	H-Ext	H-ABd	H-ADd	K-Flex	K-Ext	D-Flex	P-Flex  R	5	5	5	5	5	5	5	5 	   L	5	5	5	5	5	5	5	5	     Sensation: Intact to LT/PP/Temp/Vibration/Position b/l throughout.     Cortical: Extinction on DSS (neglect): none    Reflexes:              Biceps(C5)       BR(C6)     Triceps(C7)               Patellar(L4)    Achilles(S1)    Plantar Resp  R	2	          2	             2		        2		    2		Down   L	2	          2	             2		        2		    2		Down     Coordination: intact rapid-alt movements. No dysmetria to FTN/HTS    Gait: Normal Romberg. No postural instability. Normal stance and tandem gait.     LABORATORY:  CBC                       13.9   5.71  )-----------( 208      ( 02 Mar 2023 15:39 )             46.1     Chem 03-02    143  |  104  |  22  ----------------------------<  99  4.3   |  28  |  0.88    Ca    10.6<H>      02 Mar 2023 15:39    TPro  8.1  /  Alb  4.4  /  TBili  0.2  /  DBili  x   /  AST  21  /  ALT  14  /  AlkPhos  65  03-02    LFTs LIVER FUNCTIONS - ( 02 Mar 2023 15:39 )  Alb: 4.4 g/dL / Pro: 8.1 g/dL / ALK PHOS: 65 U/L / ALT: 14 U/L / AST: 21 U/L / GGT: x           Coagulopathy PT/INR - ( 02 Mar 2023 15:39 )   PT: 12.3 sec;   INR: 1.06 ratio         PTT - ( 02 Mar 2023 15:39 )  PTT:31.3 sec       STUDIES & IMAGING:    < from: CT Head No Cont (03.02.23 @ 17:44) >  IMPRESSION:  No acute intracranial hemorrhage or acute territorial infarct. If   symptoms persist, follow-up MRI exam recommended    No hemodynamically significant stenosis    < end of copied text >   HPI:  62F LH w/ tinnitus, osteoporosis on alendronate, chronic neck pain with multilevel cervical disc herniations presents with 3 weeks of dizziness, headache, nausea. Patient reports mild intermittent feeling of imbalance for 3 times in the last 3 weeks. The dizziness is not room-spinning, sudden onset for <1 minute, no triggers identified (happened when she was sitting), not worse with position change or head movements, with mild nausea. Pt reports chronic tinnitus worse in the last 2 weeks, no hearing loss (went to an audiologist last week). Pt also reports a headache, pulling sensation, b/l post-auricular area, not positional, gradual onset, only clear trigger is stress, unclear if related to dizziness.  No clear hx of migraines, occasionally takes ibuprofen PRN. Last MRI 3 years ago, reportedly negative. Patient reports 2 weeks ago, she had an epidural injection for her cervical neck pain.  Patient reports that day, she developed more intense dizziness. Pt  Patient reports intermittent numbness and tingling of bilateral hands for the past several years.  Patient also reports intermittent palpitations for the past several years.  Patient denies any fevers, chills, vomiting, changes in vision, hearing loss, ear pain, head trauma, use of blood thinners, history of malignancy, alcohol abuse, illicit drug use, chest pain, shortness of breath, new numbness, new weakness, new paresthesias, bowel or bladder incontinence, inability to stand or walk. 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 Pt reports some improvement with tylenol, lidocaine patch, and flexeril    NIHSS: 0  preMRS: 0    REVIEW OF SYSTEMS    A 10-system ROS was performed and is negative except for those items noted above and/or in the HPI.    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    MEDICATIONS (HOME):  Home Medications:    MEDICATIONS  (STANDING):    MEDICATIONS  (PRN):    ALLERGIES/INTOLERANCES:  Allergies  No Known Allergies    Intolerances    VITALS & EXAMINATION:  Vital Signs Last 24 Hrs  T(C): 36.7 (02 Mar 2023 12:54), Max: 36.7 (02 Mar 2023 12:54)  T(F): 98 (02 Mar 2023 12:54), Max: 98 (02 Mar 2023 12:54)  HR: 78 (02 Mar 2023 12:54) (78 - 78)  BP: 99/59 (02 Mar 2023 12:54) (99/59 - 99/59)  RR: 18 (02 Mar 2023 12:54) (18 - 18)  SpO2: 99% (02 Mar 2023 12:54) (99% - 99%)    Parameters below as of 02 Mar 2023 12:54  Patient On (Oxygen Delivery Method): room air      General:  Constitutional: Female, appears stated age, in no apparent distress including pain  Head: Normocephalic & atraumatic.  Respiratory: No increased work of breathing at rest  Extremities: No cyanosis, clubbing, or edema.  Skin: No rashes, bruising, or discoloration.    Neurological (>12):  MS: Awake, alert, oriented to person, place, situation, time. Normal affect. Follows all commands. Give full hx    Language: Speech is clear, fluent with good repetition & comprehension (able to name objects mask thumb)    CNs: PERRL (R = 3mm, L = 3mm). VFF. EOMI no nystagmus, no diplopia. V1-3 intact to LT, well developed masseter muscles b/l. No facial asymmetry b/l, full eye closure strength b/l. Hearing grossly normal (rubbing fingers) b/l. Symmetric palate elevation in midline. Gag reflex deferred. Head turning & shoulder shrug intact b/l. Tongue midline, normal movements, no atrophy.      HINTS (Head Impulse, Nystagmus, Test of Skew):   1. Head Impulse: no corrective saccade  2. Nystagmus: none   3. Test of Skew: Absent skew deviation    Motor: Normal muscle bulk & tone. No noticeable tremor or seizure. No pronator drift.              Deltoid	Biceps	Triceps	Wrist	Finger ABd	   R	5	5	5	5	5		5 	  L	5	5	5	5	5		5    	H-Flex	K-Flex	K-Ext	D-Flex	P-Flex  R	5	5	5	5	5	  L	5	5	5	5	5	     Sensation: Intact to LT b/l throughout.     Cortical: Extinction on DSS (neglect): none    Reflexes: no ankle clonus, no suprapatellars or crossed adductors              Biceps(C5)       BR(C6)     Triceps(C7)               Patellar(L4)    Achilles(S1)    Plantar Resp  R	2	          2	             		        2		    2		mute  L	2	          2	             		        2		    2		mute    Coordination: intact rapid-alt movements. No dysmetria to FTN    Gait: Normal Romberg. No postural instability. Normal stance and gait.     LABORATORY:  CBC                       13.9   5.71  )-----------( 208      ( 02 Mar 2023 15:39 )             46.1     Chem 03-02    143  |  104  |  22  ----------------------------<  99  4.3   |  28  |  0.88    Ca    10.6<H>      02 Mar 2023 15:39    TPro  8.1  /  Alb  4.4  /  TBili  0.2  /  DBili  x   /  AST  21  /  ALT  14  /  AlkPhos  65  03-02    LFTs LIVER FUNCTIONS - ( 02 Mar 2023 15:39 )  Alb: 4.4 g/dL / Pro: 8.1 g/dL / ALK PHOS: 65 U/L / ALT: 14 U/L / AST: 21 U/L / GGT: x           Coagulopathy PT/INR - ( 02 Mar 2023 15:39 )   PT: 12.3 sec;   INR: 1.06 ratio         PTT - ( 02 Mar 2023 15:39 )  PTT:31.3 sec       STUDIES & IMAGING:    < from: CT Head No Cont (03.02.23 @ 17:44) >  IMPRESSION:  No acute intracranial hemorrhage or acute territorial infarct. If   symptoms persist, follow-up MRI exam recommended    No hemodynamically significant stenosis    < end of copied text >   HPI:  62F LH w/ tinnitus, osteoporosis on alendronate, chronic neck pain with multilevel cervical disc herniations presents with 3 weeks of dizziness, headache, nausea. Patient reports mild intermittent feeling of imbalance for 3 times in the last 3 weeks. The dizziness is not room-spinning, sudden onset for <1 minute, no triggers identified (happened when she was sitting), not worse with position change or head movements, with mild nausea. Pt reports chronic tinnitus worse in the last 2 weeks, no hearing loss (went to an audiologist last week). Pt also reports a headache, pulling sensation, b/l post-auricular area, not positional, gradual onset, only clear trigger is stress, unclear if related to dizziness.  No clear hx of migraines, occasionally takes ibuprofen PRN. Last MRI 3 years ago, reportedly negative. Patient reports 2 weeks ago, she had an epidural injection for her cervical neck pain under full anesthesia. She had a prior injection in 11/2022 with improvement of neck pain. Patient reports that day, she developed more intense dizziness. Pt  Patient reports intermittent numbness and tingling of bilateral hands for the past several years.  Patient also reports intermittent palpitations for the past several years.  Patient denies any fevers, chills, vomiting, changes in vision, hearing loss, ear pain, head trauma, use of blood thinners, history of malignancy, alcohol abuse, illicit drug use, chest pain, shortness of breath, new numbness, new weakness, new paresthesias, bowel or bladder incontinence, inability to stand or walk. 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 Pt reports some improvement with tylenol, lidocaine patch, and flexeril    NIHSS: 0  preMRS: 0    REVIEW OF SYSTEMS    A 10-system ROS was performed and is negative except for those items noted above and/or in the HPI.    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    MEDICATIONS (HOME):  Home Medications:    MEDICATIONS  (STANDING):    MEDICATIONS  (PRN):    ALLERGIES/INTOLERANCES:  Allergies  No Known Allergies    Intolerances    VITALS & EXAMINATION:  Vital Signs Last 24 Hrs  T(C): 36.7 (02 Mar 2023 12:54), Max: 36.7 (02 Mar 2023 12:54)  T(F): 98 (02 Mar 2023 12:54), Max: 98 (02 Mar 2023 12:54)  HR: 78 (02 Mar 2023 12:54) (78 - 78)  BP: 99/59 (02 Mar 2023 12:54) (99/59 - 99/59)  RR: 18 (02 Mar 2023 12:54) (18 - 18)  SpO2: 99% (02 Mar 2023 12:54) (99% - 99%)    Parameters below as of 02 Mar 2023 12:54  Patient On (Oxygen Delivery Method): room air      General:  Constitutional: Female, appears stated age, in no apparent distress including pain  Head: Normocephalic & atraumatic.  Respiratory: No increased work of breathing at rest  Extremities: No cyanosis, clubbing, or edema.  Skin: No rashes, bruising, or discoloration.    Neurological (>12):  MS: Awake, alert, oriented to person, place, situation, time. Normal affect. Follows all commands. Give full hx    Language: Speech is clear, fluent with good repetition & comprehension (able to name objects mask thumb)    CNs: PERRL (R = 3mm, L = 3mm). VFF. EOMI no nystagmus, no diplopia. V1-3 intact to LT, well developed masseter muscles b/l. No facial asymmetry b/l, full eye closure strength b/l. Hearing grossly normal (rubbing fingers) b/l. Symmetric palate elevation in midline. Gag reflex deferred. Head turning & shoulder shrug intact b/l. Tongue midline, normal movements, no atrophy.      HINTS (Head Impulse, Nystagmus, Test of Skew):   1. Head Impulse: no corrective saccade  2. Nystagmus: none   3. Test of Skew: Absent skew deviation    Motor: Normal muscle bulk & tone. No noticeable tremor or seizure. No pronator drift.              Deltoid	Biceps	Triceps	Wrist	Finger ABd	   R	5	5	5	5	5		5 	  L	5	5	5	5	5		5    	H-Flex	K-Flex	K-Ext	D-Flex	P-Flex  R	5	5	5	5	5	  L	5	5	5	5	5	     Sensation: Intact to LT b/l throughout.     Cortical: Extinction on DSS (neglect): none    Reflexes: no ankle clonus, no suprapatellars or crossed adductors              Biceps(C5)       BR(C6)     Triceps(C7)               Patellar(L4)    Achilles(S1)    Plantar Resp  R	2	          2	             		        2		    2		mute  L	2	          2	             		        2		    2		mute    Coordination: intact rapid-alt movements. No dysmetria to FTN    Gait: Normal Romberg. No postural instability. Normal stance and gait.     LABORATORY:  CBC                       13.9   5.71  )-----------( 208      ( 02 Mar 2023 15:39 )             46.1     Chem 03-02    143  |  104  |  22  ----------------------------<  99  4.3   |  28  |  0.88    Ca    10.6<H>      02 Mar 2023 15:39    TPro  8.1  /  Alb  4.4  /  TBili  0.2  /  DBili  x   /  AST  21  /  ALT  14  /  AlkPhos  65  03-02    LFTs LIVER FUNCTIONS - ( 02 Mar 2023 15:39 )  Alb: 4.4 g/dL / Pro: 8.1 g/dL / ALK PHOS: 65 U/L / ALT: 14 U/L / AST: 21 U/L / GGT: x           Coagulopathy PT/INR - ( 02 Mar 2023 15:39 )   PT: 12.3 sec;   INR: 1.06 ratio         PTT - ( 02 Mar 2023 15:39 )  PTT:31.3 sec       STUDIES & IMAGING:    < from: CT Head No Cont (03.02.23 @ 17:44) >  IMPRESSION:  No acute intracranial hemorrhage or acute territorial infarct. If   symptoms persist, follow-up MRI exam recommended    No hemodynamically significant stenosis    < end of copied text >

## 2023-03-02 NOTE — ED PROVIDER NOTE - PROGRESS NOTE DETAILS
ALICIA:  Patient signed out to me by Dr. Hemphill at 19:00 pending esr, crp, neurology consult for reported difficulty ambulating in setting of unspecified recent cervical procedure.  I was informed by RN that patient was requesting discharge and did not wish to stay in ED longer.  I was then informed that patient requested IV removal and eloped from ED.  Per sign out, patient aaox3, no focal deficits, not altered or intoxicated appearing.

## 2023-03-02 NOTE — ED ADULT NURSE NOTE - CHIEF COMPLAINT QUOTE
Pt AOX4 c/o pain in neck and head and Left shoulder, s/p epidural injection x 2 weeks ago for cervical disc issue, has been feeling intermittent dizziness s/p injection  Hx of osteoporosis takes  Alendronate (generic Fosamax)

## 2023-03-02 NOTE — ED ADULT NURSE NOTE - OBJECTIVE STATEMENT
Patient received in stretcher. AOX4. Respirations even and unlabored. Neuro intact. Spontaneous movement of all extremities noted. Presents to ER c/o neck pain radiating to back of head, ringing in ears and dizziness while at work. Patient reports giving an epidural about 2 weeks ago for bulging disc but s/s didn't start immediately after and states " I don't think its related" Patient reports not taking anything for pain today. Denies numbness/ tingling, N/V/D  Comfort and safety maintained. All current care needs met. Care plan continued Elly HINES

## 2023-03-02 NOTE — ED PROVIDER NOTE - ATTENDING APP SHARED VISIT CONTRIBUTION OF CARE
61 y/o F w/ PMH osteoporosis on alendronate, chronic neck pain presents with dizziness x3 weeks.  pt states she has imbalance for 3 weeks, which intermittently worsens.  Patient reports 2 weeks ago, she had an epidural injection for her cervical neck pain. Patient states she uses felt off with her balance felt like the room is somewhat spinning. She denies fevers, chills, abdominal pain, head trauma. She reports having some neck stiffness paraspinally on the left posterior neck. She reports she has chronic neck pain for which she was getting the injection for her to begin with. Patient also reports having some ringing in her ears which has been intermittent since the injection. Patient says she went and saw an audiologist and was said that there was no issue with her hearing.  denies fever, chills, chest pain, SOB, abdominal pain, diarrhea, dysuria, syncope, bleeding, new rash, weakness, numbness, blurred vision  + dizziness  ROS  otherwise negative as per HPI  Gen: Awake, Alert, WD, WN, NAD  Head:  NC/AT  Eyes:  PERRL, EOMI, Conjunctiva pink, lids normal, no scleral icterus  ENT: OP clear,  moist mucus membranes  Neck: supple, nontender, no meningismus, no JVD, trachea midline  Cardiac/CV:  S1 S2, RRR, no M/G/R  Respiratory/Pulm:  CTAB, good air movement, normal resp effort, no wheezes/stridor/retractions/rales/rhonchi  Gastrointestinal/Abdomen:  Soft, nontender, nondistended, +BS, no rebound/guarding  Back:  no CVAT, no MLT  Ext:  warm, well perfused, moving all extremities spontaneously, no peripheral edema, distal pulses intact  Skin: intact, no rash  Neuro:  AAOx3, sensation intact, motor 5/5 x 4 extremities, normal gait, speech clear  mdm as above

## 2023-03-02 NOTE — ED ADULT NURSE REASSESSMENT NOTE - NS ED NURSE REASSESS COMMENT FT1
No acute changes in medical conditions. Respirations even and unlabored. No signs of acute distress noted Updated on plan of care by ER MD. Comfort and safety maintained. All current care needs met. Care plan continued Elly HINES

## 2023-03-03 LAB — T PALLIDUM AB TITR SER: NEGATIVE — SIGNIFICANT CHANGE UP

## 2023-05-09 ENCOUNTER — APPOINTMENT (OUTPATIENT)
Dept: OBGYN | Facility: CLINIC | Age: 63
End: 2023-05-09

## 2023-09-30 NOTE — ED PROVIDER NOTE - OBJECTIVE STATEMENT
None Patient is a 62-year-old female with past medical history of osteoporosis on alendronate, chronic neck pain presents with dizziness x3 weeks.  Patient reports mild feeling of imbalance for 3 weeks, which intermittently worsens.  Patient reports 2 weeks ago, she had an epidural injection for her cervical neck pain.  Patient reports that day, she developed more intense dizziness.  Patient also reports mild gradual  onset pressure-like occipital headache for the past 2 weeks.  Patient reports some nausea when dizziness becomes more intense.  Patient reports intermittent numbness and tingling of bilateral hands for the past several years.  Patient also reports intermittent palpitations for the past several years.  Patient denies any fevers, chills, vomiting, changes in vision, hearing loss, ear pain, head trauma, use of blood thinners, history of malignancy, alcohol abuse, illicit drug use, chest pain, shortness of breath, new numbness, new weakness, new paresthesias, inability to stand or walk.

## 2024-07-09 ENCOUNTER — APPOINTMENT (OUTPATIENT)
Dept: OBGYN | Facility: CLINIC | Age: 64
End: 2024-07-09
Payer: COMMERCIAL

## 2024-07-09 VITALS
SYSTOLIC BLOOD PRESSURE: 96 MMHG | BODY MASS INDEX: 25.23 KG/M2 | DIASTOLIC BLOOD PRESSURE: 60 MMHG | WEIGHT: 157 LBS | HEIGHT: 66 IN

## 2024-07-09 DIAGNOSIS — M81.0 AGE-RELATED OSTEOPOROSIS W/OUT CURRENT PATHOLOGICAL FRACTURE: ICD-10-CM

## 2024-07-09 DIAGNOSIS — D21.9 BENIGN NEOPLASM OF CONNECTIVE AND OTHER SOFT TISSUE, UNSPECIFIED: ICD-10-CM

## 2024-07-09 DIAGNOSIS — N83.202 UNSPECIFIED OVARIAN CYST, LEFT SIDE: ICD-10-CM

## 2024-07-09 DIAGNOSIS — Z01.419 ENCOUNTER FOR GYNECOLOGICAL EXAMINATION (GENERAL) (ROUTINE) W/OUT ABNORMAL FINDINGS: ICD-10-CM

## 2024-07-09 PROCEDURE — 99459 PELVIC EXAMINATION: CPT

## 2024-07-09 PROCEDURE — 99396 PREV VISIT EST AGE 40-64: CPT

## 2024-07-09 PROCEDURE — 82270 OCCULT BLOOD FECES: CPT

## 2024-07-10 LAB — HPV HIGH+LOW RISK DNA PNL CVX: NOT DETECTED

## 2024-08-07 ENCOUNTER — APPOINTMENT (OUTPATIENT)
Dept: OBGYN | Facility: CLINIC | Age: 64
End: 2024-08-07

## 2024-10-16 ENCOUNTER — APPOINTMENT (OUTPATIENT)
Dept: OBGYN | Facility: CLINIC | Age: 64
End: 2024-10-16
Payer: COMMERCIAL

## 2024-10-16 DIAGNOSIS — R87.611 ATYPICAL SQUAMOUS CELLS CANNOT EXCLUDE HIGH GRADE SQUAMOUS INTRAEPITHELIAL LESION ON CYTOLOGIC SMEAR OF CERVIX (ASC-H): ICD-10-CM

## 2024-10-16 PROCEDURE — 57454 BX/CURETT OF CERVIX W/SCOPE: CPT

## 2024-11-05 ENCOUNTER — APPOINTMENT (OUTPATIENT)
Dept: OBGYN | Facility: CLINIC | Age: 64
End: 2024-11-05

## 2024-11-29 ENCOUNTER — APPOINTMENT (OUTPATIENT)
Dept: OBGYN | Facility: CLINIC | Age: 64
End: 2024-11-29
Payer: COMMERCIAL

## 2024-11-29 ENCOUNTER — ASOB RESULT (OUTPATIENT)
Age: 64
End: 2024-11-29

## 2024-11-29 PROCEDURE — 76830 TRANSVAGINAL US NON-OB: CPT

## 2024-12-31 ENCOUNTER — APPOINTMENT (OUTPATIENT)
Dept: ORTHOPEDIC SURGERY | Facility: CLINIC | Age: 64
End: 2024-12-31
Payer: COMMERCIAL

## 2024-12-31 VITALS — BODY MASS INDEX: 25.39 KG/M2 | WEIGHT: 158 LBS | HEIGHT: 66 IN

## 2024-12-31 DIAGNOSIS — M25.552 PAIN IN LEFT HIP: ICD-10-CM

## 2024-12-31 DIAGNOSIS — G57.12 MERALGIA PARESTHETICA, LEFT LOWER LIMB: ICD-10-CM

## 2024-12-31 PROCEDURE — 99203 OFFICE O/P NEW LOW 30 MIN: CPT

## 2024-12-31 PROCEDURE — 72100 X-RAY EXAM L-S SPINE 2/3 VWS: CPT

## 2025-01-12 ENCOUNTER — OUTPATIENT (OUTPATIENT)
Dept: OUTPATIENT SERVICES | Facility: HOSPITAL | Age: 65
LOS: 1 days | End: 2025-01-12
Payer: COMMERCIAL

## 2025-01-12 DIAGNOSIS — Z00.8 ENCOUNTER FOR OTHER GENERAL EXAMINATION: ICD-10-CM

## 2025-01-12 DIAGNOSIS — M25.552 PAIN IN LEFT HIP: ICD-10-CM

## 2025-01-12 DIAGNOSIS — G57.12 MERALGIA PARESTHETICA, LEFT LOWER LIMB: ICD-10-CM

## 2025-01-12 PROCEDURE — 73718 MRI LOWER EXTREMITY W/O DYE: CPT

## 2025-01-12 PROCEDURE — 73721 MRI JNT OF LWR EXTRE W/O DYE: CPT

## 2025-01-14 ENCOUNTER — NON-APPOINTMENT (OUTPATIENT)
Age: 65
End: 2025-01-14

## 2025-04-01 ENCOUNTER — APPOINTMENT (OUTPATIENT)
Dept: OBGYN | Facility: CLINIC | Age: 65
End: 2025-04-01

## 2025-04-24 ENCOUNTER — APPOINTMENT (OUTPATIENT)
Dept: OBGYN | Facility: CLINIC | Age: 65
End: 2025-04-24
Payer: COMMERCIAL

## 2025-04-24 ENCOUNTER — APPOINTMENT (OUTPATIENT)
Dept: OBGYN | Facility: CLINIC | Age: 65
End: 2025-04-24

## 2025-04-24 PROCEDURE — 76830 TRANSVAGINAL US NON-OB: CPT

## 2025-04-24 PROCEDURE — 99213 OFFICE O/P EST LOW 20 MIN: CPT

## 2025-05-07 ENCOUNTER — NON-APPOINTMENT (OUTPATIENT)
Age: 65
End: 2025-05-07